# Patient Record
Sex: MALE | Race: WHITE | Employment: FULL TIME | URBAN - METROPOLITAN AREA
[De-identification: names, ages, dates, MRNs, and addresses within clinical notes are randomized per-mention and may not be internally consistent; named-entity substitution may affect disease eponyms.]

---

## 2018-11-07 ENCOUNTER — OFFICE VISIT (OUTPATIENT)
Dept: ORTHOPEDIC SURGERY | Age: 58
End: 2018-11-07
Payer: COMMERCIAL

## 2018-11-07 VITALS
BODY MASS INDEX: 24.52 KG/M2 | HEART RATE: 70 BPM | HEIGHT: 73 IN | SYSTOLIC BLOOD PRESSURE: 131 MMHG | WEIGHT: 185 LBS | DIASTOLIC BLOOD PRESSURE: 82 MMHG

## 2018-11-07 DIAGNOSIS — M25.511 CHRONIC PAIN OF BOTH SHOULDERS: ICD-10-CM

## 2018-11-07 DIAGNOSIS — M75.21 BICEPS TENDONITIS ON RIGHT: Primary | ICD-10-CM

## 2018-11-07 DIAGNOSIS — M25.512 CHRONIC PAIN OF BOTH SHOULDERS: ICD-10-CM

## 2018-11-07 DIAGNOSIS — G89.29 CHRONIC PAIN OF BOTH SHOULDERS: ICD-10-CM

## 2018-11-07 PROCEDURE — 99203 OFFICE O/P NEW LOW 30 MIN: CPT | Performed by: ORTHOPAEDIC SURGERY

## 2018-11-12 ENCOUNTER — HOSPITAL ENCOUNTER (OUTPATIENT)
Dept: PHYSICAL THERAPY | Age: 58
Setting detail: THERAPIES SERIES
Discharge: HOME | End: 2018-11-12
Payer: COMMERCIAL

## 2018-11-12 PROCEDURE — G8985 CARRY GOAL STATUS: HCPCS

## 2018-11-12 PROCEDURE — 97110 THERAPEUTIC EXERCISES: CPT

## 2018-11-12 PROCEDURE — G8984 CARRY CURRENT STATUS: HCPCS

## 2018-11-12 PROCEDURE — 97161 PT EVAL LOW COMPLEX 20 MIN: CPT

## 2018-11-15 ENCOUNTER — APPOINTMENT (OUTPATIENT)
Dept: PHYSICAL THERAPY | Age: 58
End: 2018-11-15
Payer: COMMERCIAL

## 2018-12-03 ENCOUNTER — TELEPHONE (OUTPATIENT)
Dept: ORTHOPEDIC SURGERY | Age: 58
End: 2018-12-03

## 2018-12-05 ENCOUNTER — OFFICE VISIT (OUTPATIENT)
Dept: ORTHOPEDIC SURGERY | Age: 58
End: 2018-12-05
Payer: COMMERCIAL

## 2018-12-05 VITALS
SYSTOLIC BLOOD PRESSURE: 136 MMHG | DIASTOLIC BLOOD PRESSURE: 78 MMHG | WEIGHT: 190 LBS | HEART RATE: 60 BPM | HEIGHT: 73 IN | BODY MASS INDEX: 25.18 KG/M2

## 2018-12-05 DIAGNOSIS — S46.811A PARTIAL TEAR OF SUBSCAPULARIS TENDON, RIGHT, INITIAL ENCOUNTER: ICD-10-CM

## 2018-12-05 DIAGNOSIS — S43.003A SUBLUXATION OF TENDON OF LONG HEAD OF BICEPS: Primary | ICD-10-CM

## 2018-12-05 DIAGNOSIS — M75.21 BICEPS TENDINITIS OF RIGHT UPPER EXTREMITY: ICD-10-CM

## 2018-12-05 DIAGNOSIS — M75.81 TENDINITIS OF RIGHT ROTATOR CUFF: ICD-10-CM

## 2018-12-05 PROCEDURE — 99213 OFFICE O/P EST LOW 20 MIN: CPT | Performed by: ORTHOPAEDIC SURGERY

## 2022-10-13 ENCOUNTER — OFFICE VISIT (OUTPATIENT)
Dept: ORTHOPEDIC SURGERY | Age: 62
End: 2022-10-13
Payer: COMMERCIAL

## 2022-10-13 ENCOUNTER — TELEPHONE (OUTPATIENT)
Dept: ORTHOPEDIC SURGERY | Age: 62
End: 2022-10-13

## 2022-10-13 VITALS — HEIGHT: 73 IN | WEIGHT: 185 LBS | BODY MASS INDEX: 24.52 KG/M2

## 2022-10-13 DIAGNOSIS — M22.41 CHONDROMALACIA OF RIGHT PATELLA: ICD-10-CM

## 2022-10-13 DIAGNOSIS — M17.11 PRIMARY OSTEOARTHRITIS OF RIGHT KNEE: ICD-10-CM

## 2022-10-13 DIAGNOSIS — M25.561 RIGHT KNEE PAIN, UNSPECIFIED CHRONICITY: Primary | ICD-10-CM

## 2022-10-13 PROCEDURE — 99203 OFFICE O/P NEW LOW 30 MIN: CPT | Performed by: FAMILY MEDICINE

## 2022-10-13 RX ORDER — DICLOFENAC SODIUM 75 MG/1
75 TABLET, DELAYED RELEASE ORAL 2 TIMES DAILY
Qty: 60 TABLET | Refills: 3 | Status: SHIPPED | OUTPATIENT
Start: 2022-10-13

## 2022-10-13 RX ORDER — LISINOPRIL 20 MG/1
TABLET ORAL
COMMUNITY
Start: 2022-09-27

## 2022-10-13 NOTE — TELEPHONE ENCOUNTER
Left voicemail for patient that their MRI has been authorized and that they can call and schedule scan at their convenience. Also told them that they can call and schedule a f/u with Dr. Janee Gonzalez once they have MRI scheduled, leaving at least 2-3 days for our office to receive their results.

## 2022-10-13 NOTE — PROGRESS NOTES
Chief Complaint  Knee Pain (NP R KNEE)      Initial consultation worsening right knee pain with motion loss    History of Present Illness:  Michael Torrez is a 63 y.o. 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 Rollins Abo remotely after fracturing this doing [employer] racing. His knee has done exceptionally well and up until about mid September 2022 when he noticed fairly dramatic motion loss involving his right knee. There is no recalled history of injury or trauma. He feels very tight and stiff with discomfort anteriorly but also more lateral pain is progressed to the point where is now waking him up from sleep at night. He has had some pseudo buckling and feels fairly stiff with pain ranging between an ache at 2-3 out of 10 with sharper pain at 6-7 out of 10 laterally but is more concerned about the motion loss. He does do a little bit of running on the treadmill but is refrain from this is taken some ibuprofen. He did do some [employer] racing earlier this week and states his knee feels a little bit better with some increased motion temporarily but then immediately tightened up once again. He has been icing and taking lower doses of ibuprofen. He denies true locking but may have had some catching and is now having pain at night. He has not been consistently icing. He is being seen today for orthopedic and sports consultation with imaging. Pain Assessment  Location of Pain: Knee  Location Modifiers: Right  Quality of Pain: Dull, Aching  Duration of Pain: Persistent  Frequency of Pain: Constant  Aggravating Factors:  Other (Comment) (FLEXION)  Limiting Behavior: Yes  Relieving Factors: Rest  Result of Injury: No  Work-Related Injury: No  Are there other pain locations you wish to document?: No         Medical History     Patient's medications, allergies, past medical, surgical, social and family histories were reviewed and updated as appropriate. Review of Systems  Pertinent items are noted in HPI  Review of systems reviewed from Patient History Form dated on 10/13/2022 and available in the patient's chart under the Media tab. Vital Signs  There were no vitals filed for this visit. General Exam:     Constitutional: Patient is adequately groomed with no evidence of malnutrition  DTRs: Deep tendon reflexes are intact  Mental Status: The patient is oriented to time, place and person. The patient's mood and affect are appropriate. Lymphatic: The lymphatic examination bilaterally reveals all areas to be without enlargement or induration. Vascular: Examination reveals no swelling or calf tenderness. Peripheral pulses are palpable and 2+. Neurological: The patient has good coordination. There is no weakness or sensory deficit. Knee Examination  Inspection: He does have a trace to 1+ knee joint effusion with some mild patellofemoral crepitation. He does have about a 10 to 15 degree extensor lag. Palpation: Does have some tenderness over the medial and lateral patellofemoral facet but does have focal point tenderness at 7-8 out of 10 with palpation of the central lateral joint line. Rang of Motion: Once again this about a 10 to 15 degree extensor lag with flexion to about 120 today. Produces pain laterally. It also produces tightness anteriorly. Strength: 4+ out of 5 with knee flexion extension    Special Tests: Does have more mild pain with patellar grind testing. 7-8 out of 10 pain with lateral Paola's. No high-grade click noted. Extensor lag noted. There is less tenderness medially. No obvious instability. Hamstrings and IT bands mildly tight. Skin: There are no rashes, ulcerations or lesions. Distal neurovascular exam is intact. Gait: Mild antalgia.     Reflex symmetrically preserved    Additional Comments:     Additional Examinations:  Contralateral Exam: Examination of the left knee reveals intact skin. There is no focal tenderness. The patient demonstrates full painless range of motion with regards to flexion and extension. Strength is 5/5 thorough out all planes. Ligamentous stability is grossly intact. Right Lower Extremity: Examination of the right lower extremity does not show any tenderness, deformity or injury. Range of motion is unremarkable. There is no gross instability. There are no rashes, ulcerations or lesions. Strength and tone are normal.  Left Lower Extremity: Examination of the left lower extremity does not show any tenderness, deformity or injury. Range of motion is unremarkable. There is no gross instability. There are no rashes, ulcerations or lesions. Strength and tone are normal.      Diagnostic Test Findings: Right knee AP and PA weightbearing sunrise and lateral films obtained today in the office and does show well-positioned IM yenifer to the proximal tibia without signs of loosening or periprosthetic fracture. He does appear to have some patellofemoral spurring and but only mild medial compartment narrowing. Assessment : 1.  4-week status post aggravation right knee pain with underlying mild weightbearing osteoarthritis with likely intermediate grade chondromalacia patella with extensor lag and possible occult meniscus. Impression:  Encounter Diagnoses   Name Primary?     Right knee pain, unspecified chronicity Yes    Primary osteoarthritis of right knee     Chondromalacia of right patella        Office Procedures:  Orders Placed This Encounter   Procedures    XR KNEE RIGHT (MIN 4 VIEWS)     Standing Status:   Future     Number of Occurrences:   1     Standing Expiration Date:   10/13/2023     Order Specific Question:   Reason for exam:     Answer:   pain    MRI KNEE RIGHT WO CONTRAST     Standing Status:   Future     Standing Expiration Date:   11/13/2022 Scheduling Instructions:      [facility] [address], [facility] [place] [place] [address] #:      TIME AND DATE TBD      PLEASE CALL PATIENT ONCE APPROVED TO SCHEDULE       PUSH TO [facility] SYSTEM            Remember that it may take several business days to pre-cert your MRI through your insurance. Our office will contact you as soon as we have the approval. We will not give any test results over the phone. Please call [phone] once you have your test day and time to schedule a follow up with Dr. Gaurav Caldera. Order Specific Question:   Reason for exam:     Answer:   R/O LATERAL MENISCUS TEAR, EVALUATE OA/CMP       Treatment Plan:  Treatment options were discussed with Steve Torrez. We did review his current plain films and exam findings. He has been doing very well with regard to his knee up until mid September 2022. There is no recalled history of injury . I would like for him to have an MRI to evaluate the degree of his osteoarthritic changes and chondromalacia patella but more concerned about ruling out a lateral meniscus tear with his extensor lag. He has had some mild catching but no vikram locking. We will start him on diclofenac 75 mg 1 pill twice daily. We held off on bracing and therapy for the time being. We will see him back post imaging. He will avoid running for the time being and limit his motocross. He will contact us in the interim with questions or concerns. This dictation was performed with a verbal recognition program (DRAGON) and it was checked for errors. It is possible that there are still dictated errors within this office note. If so, please bring any errors to my attention for an addendum. All efforts were made to ensure that this office note is accurate.

## 2022-10-14 ENCOUNTER — TELEPHONE (OUTPATIENT)
Dept: ORTHOPEDIC SURGERY | Age: 62
End: 2022-10-14

## 2022-10-14 NOTE — TELEPHONE ENCOUNTER
Other [facility] CALLED TO PROVIDE PA FOR PATIENT. STATED THAT THEY WILL CALL ANOTHER TIME WHEN I OFFERED TO SEND MESSAGE FOR A CALL BACK.

## 2022-10-20 ENCOUNTER — OFFICE VISIT (OUTPATIENT)
Dept: ORTHOPEDIC SURGERY | Age: 62
End: 2022-10-20
Payer: COMMERCIAL

## 2022-10-20 VITALS — BODY MASS INDEX: 24.52 KG/M2 | HEIGHT: 73 IN | WEIGHT: 185 LBS

## 2022-10-20 DIAGNOSIS — M25.561 RIGHT KNEE PAIN, UNSPECIFIED CHRONICITY: Primary | ICD-10-CM

## 2022-10-20 DIAGNOSIS — M65.9 SYNOVITIS OF KNEE: ICD-10-CM

## 2022-10-20 DIAGNOSIS — M22.41 CHONDROMALACIA OF RIGHT PATELLA: ICD-10-CM

## 2022-10-20 DIAGNOSIS — M17.11 PRIMARY OSTEOARTHRITIS OF RIGHT KNEE: ICD-10-CM

## 2022-10-20 PROBLEM — M65.969 SYNOVITIS OF KNEE: Status: ACTIVE | Noted: 2022-10-20

## 2022-10-20 PROCEDURE — MISC265 BAUERFEIND GENUTRAIN KNEE SLEEVE: Performed by: FAMILY MEDICINE

## 2022-10-20 PROCEDURE — 20610 DRAIN/INJ JOINT/BURSA W/O US: CPT | Performed by: FAMILY MEDICINE

## 2022-10-20 RX ORDER — LIDOCAINE HYDROCHLORIDE 10 MG/ML
1 INJECTION, SOLUTION INFILTRATION; PERINEURAL ONCE
Status: COMPLETED | OUTPATIENT
Start: 2022-10-20 | End: 2022-10-20

## 2022-10-20 RX ORDER — BUPIVACAINE HYDROCHLORIDE 2.5 MG/ML
2 INJECTION, SOLUTION INFILTRATION; PERINEURAL ONCE
Status: COMPLETED | OUTPATIENT
Start: 2022-10-20 | End: 2022-10-20

## 2022-10-20 RX ORDER — BETAMETHASONE SODIUM PHOSPHATE AND BETAMETHASONE ACETATE 3; 3 MG/ML; MG/ML
12 INJECTION, SUSPENSION INTRA-ARTICULAR; INTRALESIONAL; INTRAMUSCULAR; SOFT TISSUE ONCE
Status: COMPLETED | OUTPATIENT
Start: 2022-10-20 | End: 2022-10-20

## 2022-10-20 RX ADMIN — BETAMETHASONE SODIUM PHOSPHATE AND BETAMETHASONE ACETATE 12 MG: 3; 3 INJECTION, SUSPENSION INTRA-ARTICULAR; INTRALESIONAL; INTRAMUSCULAR; SOFT TISSUE at 12:04

## 2022-10-20 RX ADMIN — BUPIVACAINE HYDROCHLORIDE 5 MG: 2.5 INJECTION, SOLUTION INFILTRATION; PERINEURAL at 12:05

## 2022-10-20 RX ADMIN — LIDOCAINE HYDROCHLORIDE 1 ML: 10 INJECTION, SOLUTION INFILTRATION; PERINEURAL at 12:05

## 2022-10-20 NOTE — PROGRESS NOTES
Chief Complaint  Results (TR MRI R KNEE )      Follow-up right knee osteoarthritis chondromalacia with motion loss. Review of imaging. History of Present Illness:  Jordon Torrez is a 63 y.o. 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 Lore Angulo remotely after fracturing this doing [employer] racing. His knee has done exceptionally well and up until about mid September 2022 when he noticed fairly dramatic motion loss involving his right knee. There is no recalled history of injury or trauma. He feels very tight and stiff with discomfort anteriorly but also more lateral pain is progressed to the point where is now waking him up from sleep at night. He has had some pseudo buckling and feels fairly stiff with pain ranging between an ache at 2-3 out of 10 with sharper pain at 6-7 out of 10 laterally but is more concerned about the motion loss. He does do a little bit of running on the treadmill but is refrain from this is taken some ibuprofen. He did do some [employer] racing earlier this week and states his knee feels a little bit better with some increased motion temporarily but then immediately tightened up once again. He has been icing and taking lower doses of ibuprofen. He denies true locking but may have had some catching and is now having pain at night. He has not been consistently icing. He is being seen today for orthopedic and sports consultation with imaging. We initially evaluated [address] in the office on 10/13/2022 for his right knee mild pain with extension lag and underlying osteoarthritis. Once again his pain has not been overwhelming however he has been having a couple of episodes of pseudo buckling feels stiff and his major complaint is lack of full flexion a difficult time working out.   He does do a bit of running on the treadmill. He has noticed maybe a slight improvement of his motion since starting on the diclofenac and we did send him for an MRI of his knee which was obtained at [address] [place] [address] on 10/17/2022. This did show that his arthritic change was little bit more prominent with grade III-IV chondromalacia patella with severe thinning of the lateral patellofemoral facet and apex and grade 2-3 trochlear change. There was a moderate-sized joint effusion with synovitis and a popliteal cyst with more grade 2-3 medial compartment arthropathy. Fortunately there is no evidence of displaced meniscus tearing. Has been holding off on his treadmill running. He is not really complaining of locking or catching. Held off bracing pending his MRI. Medical History     Patient's medications, allergies, past medical, surgical, social and family histories were reviewed and updated as appropriate. Review of Systems  Pertinent items are noted in HPI  Review of systems reviewed from Patient History Form dated on 10/13/2022 and available in the patient's chart under the Media tab. Vital Signs  There were no vitals filed for this visit. General Exam:     Constitutional: Patient is adequately groomed with no evidence of malnutrition  DTRs: Deep tendon reflexes are intact  Mental Status: The patient is oriented to time, place and person. The patient's mood and affect are appropriate. Lymphatic: The lymphatic examination bilaterally reveals all areas to be without enlargement or induration. Vascular: Examination reveals no swelling or calf tenderness. Peripheral pulses are palpable and 2+. Neurological: The patient has good coordination. There is no weakness or sensory deficit. Knee Examination  Inspection: He does have a trace to 1+ knee joint effusion with some mild patellofemoral crepitation. He does have about a 10 to 15 degree extensor lag.       Palpation: Does have some ongoing tenderness over the medial and lateral patellofemoral facet but does have much less prominent focal point tenderness at 2-3 out of 10 with palpation of the central lateral joint line. He does have anterior third medial joint line tenderness. Rang of Motion: Once again this about a 10 to 15 degree extensor lag with flexion to about 120 today. Produces pain laterally. It also produces tightness anteriorly. Strength: 4+ out of 5 with knee flexion extension    Special Tests: Does have more moderate pain with patellar grind testing. 3-4 out of 10 pain with lateral Paola's. No high-grade click noted. Extensor lag noted. There is less tenderness medially. No obvious instability. Hamstrings and IT bands mildly tight. Skin: There are no rashes, ulcerations or lesions. Distal neurovascular exam is intact. Gait: Mild antalgia. Reflex symmetrically preserved    Additional Comments:     Additional Examinations:  Contralateral Exam: Examination of the left knee reveals intact skin. There is no focal tenderness. The patient demonstrates full painless range of motion with regards to flexion and extension. Strength is 5/5 thorough out all planes. Ligamentous stability is grossly intact. Right Lower Extremity: Examination of the right lower extremity does not show any tenderness, deformity or injury. Range of motion is unremarkable. There is no gross instability. There are no rashes, ulcerations or lesions. Strength and tone are normal.  Left Lower Extremity: Examination of the left lower extremity does not show any tenderness, deformity or injury. Range of motion is unremarkable. There is no gross instability. There are no rashes, ulcerations or lesions. Strength and tone are normal.      Diagnostic Test Findings: Right knee AP and PA weightbearing sunrise and lateral films reviewed from 10/13/2020 in the office and does show well-positioned IM yenifer to the proximal tibia without signs of loosening or periprosthetic fracture.   He does appear to have some patellofemoral spurring and but only mild medial compartment narrowing. Right knee [facility] 10/17/2022 as listed above  Narrative   Site: [facility] Rad #: [id number] #: [id number] Procedure: MR Right Knee w/o Contrast ; Reason for Exam: right knee pain; primary osteoarthritis of right knee; chondromalacia of right patella; rule out lateral meniscus tear, evaluate    OA/CMP   This document is confidential medical information. Unauthorized disclosure or use of this information is prohibited by law. If you are not the intended recipient of this document, please advise us by calling immediately [phone]. [facility] - [address] [place] [address]           Patient Name: Baldo Torrez   Case ID: [id number]   Patient : 1960   Referring Physician: Lusi Eduardo Hendrickson MD   Exam Date: 10/17/2022   Exam Description: MR Right Knee w/o Contrast            HISTORY:  21-year-old male with right knee pain. No known injury. No history of prior knee    surgery; patient has a yenifer in the right lower leg. Evaluate for osteoarthritis, chondromalacia    patella, lateral meniscus tear. TECHNICAL FACTORS:  Long- and short-axis fat- and water-weighted images were performed. COMPARISON:  None. FINDINGS:  Patella smith, with lateral tilt of the patella. Lateral retinaculum and MPFL are    intact. Moderate to severe chondral thinning of the lateral patellar facet and patellar apex    without penetrating erosion or localized osteoedema. Mild-to-moderate chondral thinning of the    trochlear articular surfaces. Inflammation of the lateral infrapatellar fat pad, suggestive of    chronic infrapatellar fat pad impingement. Quadriceps tendon and patellar tendon are intact. No acute tear or injury of the ACL, PCL, MCL, or lateral collateral complex. No posterolateral    or posteromedial corner injury.        No medial or lateral meniscus tear. Moderate-sized joint effusion, with suprapatellar synovitis, likely reactive in origin. Several small osseous bodies at the anterior margin of the midline tibial plateau; favor    fragmented anterior spur. Mild to moderate chondral thinning of the medial femoral condyle and tibial plateau    weight-bearing surfaces, without penetrating erosion or localized osteoedema. Mild thinning of the lateral femorotibial weight-bearing surfaces. No acute fracture or bony injury. Intramedullary yenifer placement within the tibia, without    evidence of surrounding osteolysis. No acute muscle tear or injury. Diffuse swelling within the medial popliteal fossa; suspect dehisced    gastrocnemius-semimembranosus bursal cyst.       The neurovascular bundle is intact. CONCLUSION:   1. Grade 3-4 chondromalacia of the patella, with moderate to severe chondral thinning of the    lateral patellar facet and patellar apex; no penetrating erosion or osteochondral defect. Grade 2-3 chondromalacia of the remaining patellar and trochlear articular surfaces. 2. Moderate-sized joint effusion, with suprapatellar synovitis. 3. Grade 2-3 medial femorotibial weight-bearing chondromalacia, with small osseous bodies    within the midline anterior recess; favor fragmented anterior spurs. 4. Diffuse swelling within the medial popliteal fossa; suspect dehisced    gastrocnemius-semimembranosus bursal cyst.   5. No medial or lateral meniscus tear. No cruciate or collateral ligament injury. Thank you for the opportunity to provide your interpretation. Star Galloway MD       A: DANIEL/STEVE 10/18/2022 9:20 AM                     Assessment : 1.  5-week status post aggravation right knee pain with MRI documented grade 3-4 patellofemoral arthropathy with grade 2-3 medial compartment osteoarthritis with synovitis effusion mild popliteal cyst (MRI negative for displaced meniscus tear/significant loose body ) with extension loss    Impression:  Encounter Diagnoses   Name Primary? Right knee pain, unspecified chronicity Yes    Primary osteoarthritis of right knee     Chondromalacia of right patella     Synovitis of knee        Office Procedures:  No orders of the defined types were placed in this encounter. Treatment Plan:  Treatment options were discussed with Steve Torrez. We did review his current plain films, right knee MRI and exam findings. He has been doing very well with regard to his knee up until mid September 2022. We did review his MRI which does show more prominent grade 3-4 anterior compartment and grade 2-3 medial compartment osteoarthritis but there is no evidence of displaced bucket-handle tear. I suspect his effusion and synovitis are restricting his motion. I like for him to start physical therapy out in [place] but he is leaving for [place] to check his housing to clean up this weekend for a week and a half. He was instructed on home exercises initially placed in a J brace and we did aspirate his knee with return 26 cc of clear synovial fluid to be followed by 2 cc of Celestone, 2 cc of Marcaine, 1 cc of Xylocaine. I would like for the therapist to evaluate his workout. I really do not want him running but he can do elliptical based on pain in the importance of his exercise program was discussed. He will continue with his diclofenac 75 mg 1 pill twice daily and potential for viscosupplementation was also discussed. We will see him back in about 6 weeks or when we hear about his viscosupplementation status. He will contact us in the interim with questions or concerns. .         This dictation was performed with a verbal recognition program (DRAGON) and it was checked for errors. It is possible that there are still dictated errors within this office note. If so, please bring any errors to my attention for an addendum.  All efforts were made to ensure that this office note is accurate.

## 2022-10-28 ENCOUNTER — TELEPHONE (OUTPATIENT)
Dept: ORTHOPEDIC SURGERY | Age: 62
End: 2022-10-28

## 2022-10-28 NOTE — TELEPHONE ENCOUNTER
Spoke to patient and let them know that euflexxa injections have been denied for their RIGHT knee. Spoke to patient about direct purchase options as well as option for cash based program through [facility]. Asked patient to call me when they decide what they would like to do.

## 2022-11-17 ENCOUNTER — OFFICE VISIT (OUTPATIENT)
Dept: ORTHOPEDIC SURGERY | Age: 62
End: 2022-11-17
Payer: COMMERCIAL

## 2022-11-17 VITALS — HEIGHT: 73 IN | WEIGHT: 185 LBS | BODY MASS INDEX: 24.52 KG/M2

## 2022-11-17 DIAGNOSIS — M22.41 CHONDROMALACIA OF RIGHT PATELLA: ICD-10-CM

## 2022-11-17 DIAGNOSIS — M17.11 PRIMARY OSTEOARTHRITIS OF RIGHT KNEE: ICD-10-CM

## 2022-11-17 DIAGNOSIS — M25.561 RIGHT KNEE PAIN, UNSPECIFIED CHRONICITY: Primary | ICD-10-CM

## 2022-11-17 DIAGNOSIS — M65.9 SYNOVITIS OF KNEE: ICD-10-CM

## 2022-11-17 PROCEDURE — 99213 OFFICE O/P EST LOW 20 MIN: CPT | Performed by: FAMILY MEDICINE

## 2022-11-17 RX ORDER — ETODOLAC 400 MG/1
400 TABLET, FILM COATED ORAL 2 TIMES DAILY
Qty: 60 TABLET | Refills: 3 | Status: SHIPPED | OUTPATIENT
Start: 2022-11-17 | End: 2022-11-17 | Stop reason: CLARIF

## 2022-11-17 RX ORDER — PREDNISONE 20 MG/1
TABLET ORAL
Qty: 20 TABLET | Refills: 0 | Status: SHIPPED | OUTPATIENT
Start: 2022-11-17 | End: 2022-11-17 | Stop reason: CLARIF

## 2022-11-17 RX ORDER — PREDNISONE 20 MG/1
TABLET ORAL
Qty: 20 TABLET | Refills: 0 | Status: SHIPPED | OUTPATIENT
Start: 2022-11-17

## 2022-11-17 RX ORDER — ETODOLAC 400 MG/1
400 TABLET, FILM COATED ORAL 2 TIMES DAILY
Qty: 60 TABLET | Refills: 3 | Status: SHIPPED | OUTPATIENT
Start: 2022-11-17
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
## 2022-12-06 ENCOUNTER — TELEPHONE (OUTPATIENT)
Dept: ORTHOPEDIC SURGERY | Age: 62
End: 2022-12-06

## 2022-12-06 ENCOUNTER — HOSPITAL ENCOUNTER (OUTPATIENT)
Dept: PHYSICAL THERAPY | Age: 62
Setting detail: THERAPIES SERIES
Discharge: HOME | End: 2022-12-06

## 2022-12-06 NOTE — TELEPHONE ENCOUNTER
LEFT VOICEMAIL OFFERING APPT ON 12/13. ASKED HIM TO CALL ME BACK DIRECTLY TO LET ME KNOW IF THAT MIGHT WORK.

## 2022-12-06 NOTE — TELEPHONE ENCOUNTER
Appointment Request     Patient requesting earlier appointment: Yes  Appointment offered to patient: 12-20-22  Patient Contact Number: [phone]

## 2022-12-12 RX ORDER — BETAMETHASONE SODIUM PHOSPHATE AND BETAMETHASONE ACETATE 3; 3 MG/ML; MG/ML
12 INJECTION, SUSPENSION INTRA-ARTICULAR; INTRALESIONAL; INTRAMUSCULAR; SOFT TISSUE ONCE
Status: CANCELLED | OUTPATIENT
Start: 2022-12-12 | End: 2022-12-12

## 2022-12-12 RX ORDER — LIDOCAINE HYDROCHLORIDE 10 MG/ML
1 INJECTION, SOLUTION INFILTRATION; PERINEURAL ONCE
Status: CANCELLED | OUTPATIENT
Start: 2022-12-12 | End: 2022-12-12

## 2022-12-12 RX ORDER — BUPIVACAINE HYDROCHLORIDE 2.5 MG/ML
2 INJECTION, SOLUTION INFILTRATION; PERINEURAL ONCE
Status: CANCELLED | OUTPATIENT
Start: 2022-12-12 | End: 2022-12-12

## 2022-12-13 ENCOUNTER — OFFICE VISIT (OUTPATIENT)
Dept: ORTHOPEDIC SURGERY | Age: 62
End: 2022-12-13

## 2022-12-13 VITALS — HEIGHT: 73 IN | BODY MASS INDEX: 24.52 KG/M2 | WEIGHT: 185 LBS

## 2022-12-13 DIAGNOSIS — M25.561 RIGHT KNEE PAIN, UNSPECIFIED CHRONICITY: Primary | ICD-10-CM

## 2022-12-13 DIAGNOSIS — M22.41 CHONDROMALACIA OF RIGHT PATELLA: ICD-10-CM

## 2022-12-13 DIAGNOSIS — M65.9 SYNOVITIS OF KNEE: ICD-10-CM

## 2022-12-13 DIAGNOSIS — M17.11 PRIMARY OSTEOARTHRITIS OF RIGHT KNEE: ICD-10-CM

## 2022-12-13 RX ORDER — HYALURONATE SODIUM 10 MG/ML
20 SYRINGE (ML) INTRAARTICULAR ONCE
Status: COMPLETED | OUTPATIENT
Start: 2022-12-13 | End: 2022-12-13

## 2022-12-13 RX ADMIN — Medication 20 MG: at 09:06
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
## 2022-12-20 ENCOUNTER — OFFICE VISIT (OUTPATIENT)
Dept: ORTHOPEDIC SURGERY | Age: 62
End: 2022-12-20

## 2022-12-20 VITALS — BODY MASS INDEX: 24.52 KG/M2 | HEIGHT: 73 IN | WEIGHT: 185 LBS

## 2022-12-20 DIAGNOSIS — M17.11 PRIMARY OSTEOARTHRITIS OF RIGHT KNEE: ICD-10-CM

## 2022-12-20 DIAGNOSIS — M17.11 PRIMARY OSTEOARTHRITIS OF RIGHT KNEE: Primary | ICD-10-CM

## 2022-12-20 DIAGNOSIS — M22.41 CHONDROMALACIA OF RIGHT PATELLA: ICD-10-CM

## 2022-12-20 DIAGNOSIS — M25.561 RIGHT KNEE PAIN, UNSPECIFIED CHRONICITY: ICD-10-CM

## 2022-12-20 LAB
RHEUMATOID FACTOR: 11 IU/ML
SEDIMENTATION RATE, ERYTHROCYTE: 91 MM/HR (ref 0–20)
URIC ACID, SERUM: 5.2 MG/DL (ref 3.5–7.2)

## 2022-12-20 RX ORDER — HYALURONATE SODIUM 10 MG/ML
20 SYRINGE (ML) INTRAARTICULAR ONCE
Status: COMPLETED | OUTPATIENT
Start: 2022-12-20 | End: 2022-12-20

## 2022-12-20 RX ADMIN — Medication 20 MG: at 08:40
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
## 2022-12-21 LAB
ANTI-NUCLEAR ANTIBODY (ANA): NEGATIVE
CYCLIC CITRULLINATED PEPTIDE ANTIBODY IGG: <0.5 U/ML (ref 0–2.9)

## 2022-12-22 ENCOUNTER — TELEPHONE (OUTPATIENT)
Dept: ORTHOPEDIC SURGERY | Age: 62
End: 2022-12-22

## 2022-12-22 NOTE — TELEPHONE ENCOUNTER
SPOKE TO PATIENT ABOUT [employer] EMAIL ASKING ABOUT SED RATE BEING HIGH. EXPLAINED THAT SED RATE IS A NON SPECIFIC TEST FOR INFLAMMATION. WITH HIS RESULT BEING HIGH, DR. Shiva Mak WOULD LIKE TO REFER HIM TO RHEUMATOLOGIST FOR FURTHER EVALUATION. EXPLAINED HE MAY HAVE AN UNDERLYING RHEUMATOLOGICAL CONDITION. TALKED TO HIM ABOUT [facility] AND CHECKING WITH HIS INSURANCE FOR OPTIONS AND THEN WE CAN GIVE HIM A REFERRAL.

## 2022-12-23 ENCOUNTER — TELEPHONE (OUTPATIENT)
Dept: ORTHOPEDIC SURGERY | Age: 62
End: 2022-12-23

## 2022-12-23 DIAGNOSIS — M19.90 ARTHRITIS: ICD-10-CM

## 2022-12-23 DIAGNOSIS — M22.41 CHONDROMALACIA OF RIGHT PATELLA: ICD-10-CM

## 2022-12-23 DIAGNOSIS — M17.11 PRIMARY OSTEOARTHRITIS OF RIGHT KNEE: Primary | ICD-10-CM

## 2022-12-23 NOTE — TELEPHONE ENCOUNTER
General Question     Subject: Rheumatology Referral Request  Patient and /or Facility Request: [facility] Number: [phone]       Patient request a referral be sent to the [address] [place] [address] Rheumatology group per previous appt conversation w/. Please contact patient at above tele# if needed.

## 2023-01-05 ENCOUNTER — OFFICE VISIT (OUTPATIENT)
Dept: ORTHOPEDIC SURGERY | Age: 63
End: 2023-01-05

## 2023-01-05 VITALS — HEIGHT: 73 IN | WEIGHT: 185 LBS | BODY MASS INDEX: 24.52 KG/M2

## 2023-01-05 DIAGNOSIS — R70.0 ELEVATED SED RATE: ICD-10-CM

## 2023-01-05 DIAGNOSIS — M17.11 PRIMARY OSTEOARTHRITIS OF RIGHT KNEE: Primary | ICD-10-CM

## 2023-01-05 DIAGNOSIS — M25.561 RIGHT KNEE PAIN, UNSPECIFIED CHRONICITY: ICD-10-CM

## 2023-01-05 DIAGNOSIS — M22.41 CHONDROMALACIA OF RIGHT PATELLA: ICD-10-CM

## 2023-01-05 RX ORDER — HYALURONATE SODIUM 10 MG/ML
20 SYRINGE (ML) INTRAARTICULAR ONCE
Status: COMPLETED | OUTPATIENT
Start: 2023-01-05 | End: 2023-01-05

## 2023-01-05 RX ORDER — PREDNISONE 20 MG/1
TABLET ORAL
Qty: 20 TABLET | Refills: 0 | Status: SHIPPED | OUTPATIENT
Start: 2023-01-05

## 2023-01-05 RX ADMIN — Medication 20 MG: at 08:37
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
## 2024-04-23 ENCOUNTER — OFFICE VISIT (OUTPATIENT)
Dept: ORTHOPEDIC SURGERY | Age: 64
End: 2024-04-23
Payer: COMMERCIAL

## 2024-04-23 DIAGNOSIS — M35.3 POLYMYALGIA RHEUMATICA (HCC): ICD-10-CM

## 2024-04-23 DIAGNOSIS — M25.561 RIGHT KNEE PAIN, UNSPECIFIED CHRONICITY: ICD-10-CM

## 2024-04-23 DIAGNOSIS — M22.41 CHONDROMALACIA OF RIGHT PATELLA: ICD-10-CM

## 2024-04-23 DIAGNOSIS — M17.11 PRIMARY OSTEOARTHRITIS OF RIGHT KNEE: Primary | ICD-10-CM

## 2024-04-23 PROCEDURE — 99214 OFFICE O/P EST MOD 30 MIN: CPT | Performed by: FAMILY MEDICINE

## 2024-04-23 PROCEDURE — 20610 DRAIN/INJ JOINT/BURSA W/O US: CPT | Performed by: FAMILY MEDICINE

## 2024-04-23 RX ORDER — ETODOLAC 400 MG/1
400 TABLET, FILM COATED ORAL 2 TIMES DAILY
Qty: 60 TABLET | Refills: 3 | Status: SHIPPED | OUTPATIENT
Start: 2024-04-23 | End: 2025-04-23

## 2024-04-23 RX ORDER — MELOXICAM 15 MG/1
15 TABLET ORAL DAILY
COMMUNITY
Start: 2024-03-12

## 2024-04-23 RX ORDER — BETAMETHASONE SODIUM PHOSPHATE AND BETAMETHASONE ACETATE 3; 3 MG/ML; MG/ML
12 INJECTION, SUSPENSION INTRA-ARTICULAR; INTRALESIONAL; INTRAMUSCULAR; SOFT TISSUE ONCE
Status: COMPLETED | OUTPATIENT
Start: 2024-04-23 | End: 2024-04-23

## 2024-04-23 RX ORDER — BUPIVACAINE HYDROCHLORIDE 2.5 MG/ML
2 INJECTION, SOLUTION INFILTRATION; PERINEURAL ONCE
Status: COMPLETED | OUTPATIENT
Start: 2024-04-23 | End: 2024-04-23

## 2024-04-23 RX ADMIN — BETAMETHASONE SODIUM PHOSPHATE AND BETAMETHASONE ACETATE 12 MG: 3; 3 INJECTION, SUSPENSION INTRA-ARTICULAR; INTRALESIONAL; INTRAMUSCULAR; SOFT TISSUE at 08:50

## 2024-04-23 RX ADMIN — Medication 1 ML: at 08:51

## 2024-04-23 RX ADMIN — BUPIVACAINE HYDROCHLORIDE 5 MG: 2.5 INJECTION, SOLUTION INFILTRATION; PERINEURAL at 08:51

## 2024-04-23 NOTE — PROGRESS NOTES
chondromalacia, with small osseous bodies    within the midline anterior recess; favor fragmented anterior spurs.   4. Diffuse swelling within the medial popliteal fossa; suspect dehisced    gastrocnemius-semimembranosus bursal cyst.   5. No medial or lateral meniscus tear. No cruciate or collateral ligament injury.       Thank you for the opportunity to provide your interpretation.               Cody Adan MD       A: DANIEL/STEVE 10/18/2022 9:20 AM                     Assessment : 1.  3 weeks status post recurrent aggravation right knee pain with MRI documented grade 3-4 patellofemoral arthropathy with grade 2-3 medial compartment osteoarthritis with recurrent synovitis effusion mild popliteal cyst (MRI negative for displaced meniscus tear/significant loose body ) with extension loss secondary to hamstring inflexibility status post completion of right knee viscosupplementation 1/5/2023.   #2. rheumatology treatment last year for PMR versus seronegative rheumatoid arthritis seeing Dr. Citlalli Vera    Impression:  Encounter Diagnoses   Name Primary?    Primary osteoarthritis of right knee Yes    Chondromalacia of right patella     Right knee pain, unspecified chronicity     Polymyalgia rheumatica (HCC)          Office Procedures:  No orders of the defined types were placed in this encounter.          Treatment Plan:  Treatment options were discussed with Steve Torrez.  We did review his previous plain films, right knee MRI and exam findings.  He has been doing very well with regard to his knee up until mid September 2022.  We did consider review his MRI which does show more prominent grade 3-4 anterior compartment and grade 2-3 medial compartment osteoarthritis but there is no evidence of displaced bucket-handle tear.  He had initially done very well with treatment but once again did have very substantial elevations in his CRP last year which did prompt us to refer him to Dr. Citlalli Vera and rheumatology tentatively

## 2024-05-15 ENCOUNTER — TELEPHONE (OUTPATIENT)
Dept: ORTHOPEDIC SURGERY | Age: 64
End: 2024-05-15

## 2024-05-15 NOTE — TELEPHONE ENCOUNTER
Spoke to patient and let him know that he does not have euflexxa medical or specialty pharmacy benefits with his current insurance. Explained the direct purchase cost for the medication/injection charge. Patient said he's doing pretty well right now from the cortisone and will let us know moving forward if he would like to try visco at a self pay/direct purchase price or if he would rather get cortisone injections at varying intervals depending on need.

## 2024-07-11 ENCOUNTER — OFFICE VISIT (OUTPATIENT)
Dept: ORTHOPEDIC SURGERY | Age: 64
End: 2024-07-11
Payer: COMMERCIAL

## 2024-07-11 VITALS — HEIGHT: 73 IN | WEIGHT: 190 LBS | BODY MASS INDEX: 25.18 KG/M2

## 2024-07-11 DIAGNOSIS — M17.11 PRIMARY OSTEOARTHRITIS OF RIGHT KNEE: Primary | ICD-10-CM

## 2024-07-11 DIAGNOSIS — M25.561 RIGHT KNEE PAIN, UNSPECIFIED CHRONICITY: ICD-10-CM

## 2024-07-11 DIAGNOSIS — M22.41 CHONDROMALACIA OF RIGHT PATELLA: ICD-10-CM

## 2024-07-11 DIAGNOSIS — M35.3 POLYMYALGIA RHEUMATICA (HCC): ICD-10-CM

## 2024-07-11 DIAGNOSIS — M65.9 SYNOVITIS OF KNEE: ICD-10-CM

## 2024-07-11 PROCEDURE — 20610 DRAIN/INJ JOINT/BURSA W/O US: CPT | Performed by: FAMILY MEDICINE

## 2024-07-11 PROCEDURE — 99214 OFFICE O/P EST MOD 30 MIN: CPT | Performed by: FAMILY MEDICINE

## 2024-07-11 RX ORDER — BETAMETHASONE SODIUM PHOSPHATE AND BETAMETHASONE ACETATE 3; 3 MG/ML; MG/ML
12 INJECTION, SUSPENSION INTRA-ARTICULAR; INTRALESIONAL; INTRAMUSCULAR; SOFT TISSUE ONCE
Status: COMPLETED | OUTPATIENT
Start: 2024-07-11 | End: 2024-07-11

## 2024-07-11 RX ORDER — BUPIVACAINE HYDROCHLORIDE 2.5 MG/ML
2 INJECTION, SOLUTION INFILTRATION; PERINEURAL ONCE
Status: COMPLETED | OUTPATIENT
Start: 2024-07-11 | End: 2024-07-11

## 2024-07-11 RX ADMIN — BUPIVACAINE HYDROCHLORIDE 5 MG: 2.5 INJECTION, SOLUTION INFILTRATION; PERINEURAL at 08:56

## 2024-07-11 RX ADMIN — Medication 1 ML: at 08:56

## 2024-07-11 RX ADMIN — BETAMETHASONE SODIUM PHOSPHATE AND BETAMETHASONE ACETATE 12 MG: 3; 3 INJECTION, SUSPENSION INTRA-ARTICULAR; INTRALESIONAL; INTRAMUSCULAR; SOFT TISSUE at 08:55
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
## 2024-07-22 ENCOUNTER — TELEPHONE (OUTPATIENT)
Dept: ORTHOPEDIC SURGERY | Age: 64
End: 2024-07-22

## 2024-07-22 NOTE — TELEPHONE ENCOUNTER
[facility] left a voicemail requesting a call to clarify Euflexxa orders [phone].

## 2024-07-23 ENCOUNTER — OFFICE VISIT (OUTPATIENT)
Dept: ORTHOPEDIC SURGERY | Age: 64
End: 2024-07-23
Payer: COMMERCIAL

## 2024-07-23 DIAGNOSIS — M22.41 CHONDROMALACIA OF RIGHT PATELLA: ICD-10-CM

## 2024-07-23 DIAGNOSIS — M25.561 RIGHT KNEE PAIN, UNSPECIFIED CHRONICITY: ICD-10-CM

## 2024-07-23 DIAGNOSIS — M17.11 PRIMARY OSTEOARTHRITIS OF RIGHT KNEE: Primary | ICD-10-CM

## 2024-07-23 PROCEDURE — 99213 OFFICE O/P EST LOW 20 MIN: CPT | Performed by: FAMILY MEDICINE

## 2024-07-23 PROCEDURE — 20610 DRAIN/INJ JOINT/BURSA W/O US: CPT | Performed by: FAMILY MEDICINE

## 2024-07-23 RX ORDER — HYALURONATE SODIUM 10 MG/ML
20 SYRINGE (ML) INTRAARTICULAR ONCE
Status: COMPLETED | OUTPATIENT
Start: 2024-07-23 | End: 2024-07-23

## 2024-07-23 RX ADMIN — Medication 20 MG: at 09:35

## 2024-07-23 NOTE — PROGRESS NOTES
started back on his anti-inflammatories of 4 to 5 days ago with improvement of his swelling.  His overall pain is fairly minor at 3 but sometimes can be a 6-7 out of 10.  He has had some reaccumulation of swelling.  Denies locking catching or true instability symptoms.  He is continue to race and denies true instability symptoms or night pain.    We last saw Steve in the office on 7/11/2024 for recurrence of pain for his underlying grade 3-4 patellofemoral arthropathy with grade 2 medial compartment osteoarthritis.  He once again does have PMR versus seronegative rheumatoid and is feeling somewhat better although he did have some soreness as he did some motorcycle trip practice this weekend.  No interim history of injury.  After his activities over the weekend he did have about 4 out of 10 pain with minimal swelling.  Once again no locking catching or true instability symptoms.  He has been contemplating viscosupplementation once again which has helped him in the past and would like to avoid surgery if at all possible.  Thankfully minimal rest and night pain.  He has been a little inconsistent with his home-based exercises and does have some IT band and hamstring tightness.        Medical History     Patient's medications, allergies, past medical, surgical, social and family histories were reviewed and updated as appropriate.    Review of Systems  Pertinent items are noted in HPI  Review of systems reviewed from Patient History Form dated on 10/13/2022 and available in the patient's chart under the Media tab.       Vital Signs  There were no vitals filed for this visit.    General Exam:     Constitutional: Patient is adequately groomed with no evidence of malnutrition  DTRs: Deep tendon reflexes are intact  Mental Status: The patient is oriented to time, place and person. The patient's mood and affect are appropriate.  Lymphatic: The lymphatic examination bilaterally reveals all areas to be without enlargement or

## 2024-07-23 NOTE — TELEPHONE ENCOUNTER
SPOKE TO SPECIALTY PHARMACY, TOLD THEM TO DISREGARD RX AS PATIENT HAD ALREADY PURCHASED THROUGH [employer] DIRECT PURCHASE.

## 2024-07-30 ENCOUNTER — OFFICE VISIT (OUTPATIENT)
Dept: ORTHOPEDIC SURGERY | Age: 64
End: 2024-07-30
Payer: COMMERCIAL

## 2024-07-30 DIAGNOSIS — M22.41 CHONDROMALACIA OF RIGHT PATELLA: ICD-10-CM

## 2024-07-30 DIAGNOSIS — M25.561 RIGHT KNEE PAIN, UNSPECIFIED CHRONICITY: ICD-10-CM

## 2024-07-30 DIAGNOSIS — M17.11 PRIMARY OSTEOARTHRITIS OF RIGHT KNEE: Primary | ICD-10-CM

## 2024-07-30 PROCEDURE — 20610 DRAIN/INJ JOINT/BURSA W/O US: CPT | Performed by: FAMILY MEDICINE

## 2024-07-30 RX ORDER — HYALURONATE SODIUM 10 MG/ML
20 SYRINGE (ML) INTRAARTICULAR ONCE
Status: COMPLETED | OUTPATIENT
Start: 2024-07-30 | End: 2024-07-30

## 2024-07-30 RX ADMIN — Medication 20 MG: at 08:53
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
## 2024-08-09 RX ORDER — HYALURONATE SODIUM 10 MG/ML
20 SYRINGE (ML) INTRAARTICULAR ONCE
Status: CANCELLED | OUTPATIENT
Start: 2024-08-09 | End: 2024-08-09

## 2024-08-13 ENCOUNTER — OFFICE VISIT (OUTPATIENT)
Dept: ORTHOPEDIC SURGERY | Age: 64
End: 2024-08-13
Payer: COMMERCIAL

## 2024-08-13 DIAGNOSIS — M25.561 RIGHT KNEE PAIN, UNSPECIFIED CHRONICITY: ICD-10-CM

## 2024-08-13 DIAGNOSIS — M17.11 PRIMARY OSTEOARTHRITIS OF RIGHT KNEE: Primary | ICD-10-CM

## 2024-08-13 DIAGNOSIS — M22.41 CHONDROMALACIA OF RIGHT PATELLA: ICD-10-CM

## 2024-08-13 PROCEDURE — 20610 DRAIN/INJ JOINT/BURSA W/O US: CPT | Performed by: FAMILY MEDICINE

## 2024-08-13 RX ORDER — HYALURONATE SODIUM 10 MG/ML
20 SYRINGE (ML) INTRAARTICULAR ONCE
Status: COMPLETED | OUTPATIENT
Start: 2024-08-13 | End: 2024-08-13

## 2024-08-13 RX ADMIN — Medication 20 MG: at 08:51
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
## 2025-01-24 DIAGNOSIS — M17.11 PRIMARY OSTEOARTHRITIS OF RIGHT KNEE: Primary | ICD-10-CM

## 2025-01-24 DIAGNOSIS — M22.41 CHONDROMALACIA OF RIGHT PATELLA: ICD-10-CM

## 2025-01-24 DIAGNOSIS — M25.561 RIGHT KNEE PAIN, UNSPECIFIED CHRONICITY: ICD-10-CM

## 2025-01-31 ENCOUNTER — TELEPHONE (OUTPATIENT)
Dept: ORTHOPEDIC SURGERY | Age: 65
End: 2025-01-31

## 2025-01-31 NOTE — TELEPHONE ENCOUNTER
LVM for patient that it getting close to 6 months since his last round of euflexxa (eligible 2/13/25 or after). I was calling to inquire if he would like to repeat injections. I acknowledged that he had to pay out of pocket last time and that when double checking with insurance again this calendar year he still does not have benefits so it would be an out of pocket cost again at this time. Left my direct number if he was interested in scheduling.